# Patient Record
Sex: FEMALE | Race: WHITE | URBAN - METROPOLITAN AREA
[De-identification: names, ages, dates, MRNs, and addresses within clinical notes are randomized per-mention and may not be internally consistent; named-entity substitution may affect disease eponyms.]

---

## 2023-11-03 ENCOUNTER — HOSPITAL ENCOUNTER (EMERGENCY)
Age: 68
Discharge: HOME | End: 2023-11-04
Attending: EMERGENCY MEDICINE

## 2023-11-03 VITALS
OXYGEN SATURATION: 96 % | WEIGHT: 163 LBS | HEART RATE: 85 BPM | HEIGHT: 65 IN | RESPIRATION RATE: 18 BRPM | BODY MASS INDEX: 27.16 KG/M2 | SYSTOLIC BLOOD PRESSURE: 133 MMHG | DIASTOLIC BLOOD PRESSURE: 83 MMHG | TEMPERATURE: 98 F

## 2023-11-03 DIAGNOSIS — E87.1 HYPONATREMIA: Primary | ICD-10-CM

## 2023-11-03 LAB
ANION GAP SERPL CALCULATED.3IONS-SCNC: 12 MMOL/L (ref 3–16)
BASOPHILS # BLD: 0 K/UL (ref 0–0.2)
BASOPHILS NFR BLD: 0.5 %
BILIRUB UR QL STRIP.AUTO: NEGATIVE
BUN SERPL-MCNC: 10 MG/DL (ref 7–20)
CALCIUM SERPL-MCNC: 9.3 MG/DL (ref 8.3–10.6)
CHLORIDE SERPL-SCNC: 91 MMOL/L (ref 99–110)
CLARITY UR: CLEAR
CO2 SERPL-SCNC: 25 MMOL/L (ref 21–32)
COLOR UR: YELLOW
CREAT SERPL-MCNC: 0.5 MG/DL (ref 0.6–1.2)
DEPRECATED RDW RBC AUTO: 13 % (ref 12.4–15.4)
EOSINOPHIL # BLD: 0.1 K/UL (ref 0–0.6)
EOSINOPHIL NFR BLD: 1.6 %
GFR SERPLBLD CREATININE-BSD FMLA CKD-EPI: >60 ML/MIN/{1.73_M2}
GLUCOSE SERPL-MCNC: 105 MG/DL (ref 70–99)
GLUCOSE UR STRIP.AUTO-MCNC: NEGATIVE MG/DL
HCT VFR BLD AUTO: 37.9 % (ref 36–48)
HGB BLD-MCNC: 12.8 G/DL (ref 12–16)
HGB UR QL STRIP.AUTO: NEGATIVE
KETONES UR STRIP.AUTO-MCNC: NEGATIVE MG/DL
LEUKOCYTE ESTERASE UR QL STRIP.AUTO: NEGATIVE
LYMPHOCYTES # BLD: 1.4 K/UL (ref 1–5.1)
LYMPHOCYTES NFR BLD: 29.5 %
MAGNESIUM SERPL-MCNC: 2.1 MG/DL (ref 1.8–2.4)
MCH RBC QN AUTO: 31.3 PG (ref 26–34)
MCHC RBC AUTO-ENTMCNC: 33.8 G/DL (ref 31–36)
MCV RBC AUTO: 92.6 FL (ref 80–100)
MONOCYTES # BLD: 0.5 K/UL (ref 0–1.3)
MONOCYTES NFR BLD: 10.5 %
NEUTROPHILS # BLD: 2.8 K/UL (ref 1.7–7.7)
NEUTROPHILS NFR BLD: 57.9 %
NITRITE UR QL STRIP.AUTO: NEGATIVE
OSMOLALITY UR: 130 MOSM/KG (ref 390–1070)
PH UR STRIP.AUTO: 7 [PH] (ref 5–8)
PLATELET # BLD AUTO: 354 K/UL (ref 135–450)
PMV BLD AUTO: 8.2 FL (ref 5–10.5)
POTASSIUM SERPL-SCNC: 4.3 MMOL/L (ref 3.5–5.1)
PROT UR STRIP.AUTO-MCNC: NEGATIVE MG/DL
RBC # BLD AUTO: 4.09 M/UL (ref 4–5.2)
SODIUM SERPL-SCNC: 128 MMOL/L (ref 136–145)
SODIUM UR-SCNC: 28 MMOL/L
SP GR UR STRIP.AUTO: 1.01 (ref 1–1.03)
UA COMPLETE W REFLEX CULTURE PNL UR: NORMAL
UA DIPSTICK W REFLEX MICRO PNL UR: NORMAL
URN SPEC COLLECT METH UR: NORMAL
UROBILINOGEN UR STRIP-ACNC: 0.2 E.U./DL
WBC # BLD AUTO: 4.9 K/UL (ref 4–11)

## 2023-11-03 PROCEDURE — 81003 URINALYSIS AUTO W/O SCOPE: CPT

## 2023-11-03 PROCEDURE — 83935 ASSAY OF URINE OSMOLALITY: CPT

## 2023-11-03 PROCEDURE — 83735 ASSAY OF MAGNESIUM: CPT

## 2023-11-03 PROCEDURE — 80048 BASIC METABOLIC PNL TOTAL CA: CPT

## 2023-11-03 PROCEDURE — 84300 ASSAY OF URINE SODIUM: CPT

## 2023-11-03 PROCEDURE — 99283 EMERGENCY DEPT VISIT LOW MDM: CPT

## 2023-11-03 PROCEDURE — 85025 COMPLETE CBC W/AUTO DIFF WBC: CPT

## 2023-11-03 ASSESSMENT — ENCOUNTER SYMPTOMS
SHORTNESS OF BREATH: 0
NAUSEA: 0
ABDOMINAL PAIN: 0
VOMITING: 0
EYE REDNESS: 0

## 2023-11-03 NOTE — DISCHARGE INSTRUCTIONS
Follow-up with your primary care physician. Call to schedule a follow-up appointment. Return to emergency department with new or worsening symptoms.

## 2023-11-04 NOTE — ED PROVIDER NOTES
ED Attending Attestation Note     Date of evaluation: 11/3/2023    This patient was seen by the advance practice provider. I have seen and examined the patient, agree with the workup, evaluation, management and diagnosis. The care plan has been discussed. My assessment reveals asymptomatic adult female who presents after reported hyponatremia at 124 on her outpatient labs that were drawn for yearly visit to her physician. She is noted to have some previous hyponatremia in the past, and she says that she drinks lots of water occasionally Powerade but generally just free water daily because it helps with stiffness and numbness feeling in the morning. Sodium here is 128. I think this can be managed as an outpatient, rechecked in a week. We will have her increase her sodium intake in the meantime. For any worsening symptoms.      Debbie Huff MD  11/03/23 [id number]

## 2024-06-14 ENCOUNTER — HOSPITAL ENCOUNTER (INPATIENT)
Age: 69
LOS: 1 days | Discharge: HOME | End: 2024-06-15
Attending: EMERGENCY MEDICINE | Admitting: STUDENT IN AN ORGANIZED HEALTH CARE EDUCATION/TRAINING PROGRAM
Payer: MEDICARE

## 2024-06-14 ENCOUNTER — APPOINTMENT (OUTPATIENT)
Dept: CT IMAGING | Age: 69
End: 2024-06-14
Payer: MEDICARE

## 2024-06-14 DIAGNOSIS — E87.1 HYPONATREMIA: Primary | ICD-10-CM

## 2024-06-14 LAB
AMORPH SED URNS QL MICRO: ABNORMAL /HPF
ANION GAP SERPL CALCULATED.3IONS-SCNC: 12 MMOL/L (ref 3–16)
ANION GAP SERPL CALCULATED.3IONS-SCNC: 13 MMOL/L (ref 3–16)
BACTERIA URNS QL MICRO: ABNORMAL /HPF
BASOPHILS # BLD: 0 K/UL (ref 0–0.2)
BASOPHILS NFR BLD: 0.3 %
BILIRUB UR QL STRIP.AUTO: NEGATIVE
BUN SERPL-MCNC: 8 MG/DL (ref 7–20)
BUN SERPL-MCNC: 9 MG/DL (ref 7–20)
CALCIUM SERPL-MCNC: 8.7 MG/DL (ref 8.3–10.6)
CALCIUM SERPL-MCNC: 8.9 MG/DL (ref 8.3–10.6)
CHLORIDE SERPL-SCNC: 86 MMOL/L (ref 99–110)
CHLORIDE SERPL-SCNC: 99 MMOL/L (ref 99–110)
CLARITY UR: CLEAR
CO2 SERPL-SCNC: 22 MMOL/L (ref 21–32)
CO2 SERPL-SCNC: 24 MMOL/L (ref 21–32)
COLOR UR: YELLOW
CREAT SERPL-MCNC: 0.5 MG/DL (ref 0.6–1.2)
CREAT SERPL-MCNC: 0.7 MG/DL (ref 0.6–1.2)
DEPRECATED RDW RBC AUTO: 12.2 % (ref 12.4–15.4)
EKG ATRIAL RATE: 101 BPM
EKG DIAGNOSIS: NORMAL
EKG P AXIS: 43 DEGREES
EKG P-R INTERVAL: 170 MS
EKG Q-T INTERVAL: 358 MS
EKG QRS DURATION: 94 MS
EKG QTC CALCULATION (BAZETT): 464 MS
EKG R AXIS: 4 DEGREES
EKG T AXIS: 13 DEGREES
EKG VENTRICULAR RATE: 101 BPM
EOSINOPHIL # BLD: 0 K/UL (ref 0–0.6)
EOSINOPHIL NFR BLD: 0 %
GFR SERPLBLD CREATININE-BSD FMLA CKD-EPI: >90 ML/MIN/{1.73_M2}
GFR SERPLBLD CREATININE-BSD FMLA CKD-EPI: >90 ML/MIN/{1.73_M2}
GLUCOSE SERPL-MCNC: 117 MG/DL (ref 70–99)
GLUCOSE SERPL-MCNC: 149 MG/DL (ref 70–99)
GLUCOSE UR STRIP.AUTO-MCNC: NEGATIVE MG/DL
HCT VFR BLD AUTO: 37.7 % (ref 36–48)
HGB BLD-MCNC: 12.7 G/DL (ref 12–16)
HGB UR QL STRIP.AUTO: ABNORMAL
KETONES UR STRIP.AUTO-MCNC: NEGATIVE MG/DL
LEUKOCYTE ESTERASE UR QL STRIP.AUTO: NEGATIVE
LYMPHOCYTES # BLD: 0.6 K/UL (ref 1–5.1)
LYMPHOCYTES NFR BLD: 7 %
MAGNESIUM SERPL-MCNC: 1.8 MG/DL (ref 1.8–2.4)
MCH RBC QN AUTO: 31.5 PG (ref 26–34)
MCHC RBC AUTO-ENTMCNC: 33.6 G/DL (ref 31–36)
MCV RBC AUTO: 93.8 FL (ref 80–100)
MONOCYTES # BLD: 0.3 K/UL (ref 0–1.3)
MONOCYTES NFR BLD: 3.3 %
NEUTROPHILS # BLD: 7.9 K/UL (ref 1.7–7.7)
NEUTROPHILS NFR BLD: 89.4 %
NITRITE UR QL STRIP.AUTO: NEGATIVE
OSMOLALITY SERPL: 283 MOSM/KG (ref 278–305)
OSMOLALITY UR: 187 MOSM/KG (ref 390–1070)
PH UR STRIP.AUTO: 7 [PH] (ref 5–8)
PLATELET # BLD AUTO: 367 K/UL (ref 135–450)
PMV BLD AUTO: 7.9 FL (ref 5–10.5)
POTASSIUM SERPL-SCNC: 3.4 MMOL/L (ref 3.5–5.1)
POTASSIUM SERPL-SCNC: 3.6 MMOL/L (ref 3.5–5.1)
POTASSIUM UR-SCNC: 15.2 MMOL/L
PROT UR STRIP.AUTO-MCNC: NEGATIVE MG/DL
RBC # BLD AUTO: 4.03 M/UL (ref 4–5.2)
RBC #/AREA URNS HPF: ABNORMAL /HPF (ref 0–4)
SODIUM SERPL-SCNC: 121 MMOL/L (ref 136–145)
SODIUM SERPL-SCNC: 132 MMOL/L (ref 136–145)
SODIUM SERPL-SCNC: 135 MMOL/L (ref 136–145)
SODIUM UR-SCNC: 62 MMOL/L
SP GR UR STRIP.AUTO: 1.01 (ref 1–1.03)
TROPONIN, HIGH SENSITIVITY: 7 NG/L (ref 0–14)
UA COMPLETE W REFLEX CULTURE PNL UR: ABNORMAL
UA DIPSTICK W REFLEX MICRO PNL UR: YES
URN SPEC COLLECT METH UR: ABNORMAL
UROBILINOGEN UR STRIP-ACNC: 0.2 E.U./DL
WBC # BLD AUTO: 8.9 K/UL (ref 4–11)
WBC #/AREA URNS HPF: ABNORMAL /HPF (ref 0–5)

## 2024-06-14 PROCEDURE — 83735 ASSAY OF MAGNESIUM: CPT

## 2024-06-14 PROCEDURE — 6360000002 HC RX W HCPCS: Performed by: EMERGENCY MEDICINE

## 2024-06-14 PROCEDURE — 6370000000 HC RX 637 (ALT 250 FOR IP): Performed by: INTERNAL MEDICINE

## 2024-06-14 PROCEDURE — 96374 THER/PROPH/DIAG INJ IV PUSH: CPT

## 2024-06-14 PROCEDURE — 84295 ASSAY OF SERUM SODIUM: CPT

## 2024-06-14 PROCEDURE — 85025 COMPLETE CBC W/AUTO DIFF WBC: CPT

## 2024-06-14 PROCEDURE — 2580000003 HC RX 258: Performed by: EMERGENCY MEDICINE

## 2024-06-14 PROCEDURE — 84133 ASSAY OF URINE POTASSIUM: CPT

## 2024-06-14 PROCEDURE — 99285 EMERGENCY DEPT VISIT HI MDM: CPT

## 2024-06-14 PROCEDURE — 99223 1ST HOSP IP/OBS HIGH 75: CPT | Performed by: INTERNAL MEDICINE

## 2024-06-14 PROCEDURE — 2580000003 HC RX 258: Performed by: INTERNAL MEDICINE

## 2024-06-14 PROCEDURE — 83935 ASSAY OF URINE OSMOLALITY: CPT

## 2024-06-14 PROCEDURE — 84484 ASSAY OF TROPONIN QUANT: CPT

## 2024-06-14 PROCEDURE — 80048 BASIC METABOLIC PNL TOTAL CA: CPT

## 2024-06-14 PROCEDURE — 84300 ASSAY OF URINE SODIUM: CPT

## 2024-06-14 PROCEDURE — 6360000002 HC RX W HCPCS: Performed by: INTERNAL MEDICINE

## 2024-06-14 PROCEDURE — 96361 HYDRATE IV INFUSION ADD-ON: CPT

## 2024-06-14 PROCEDURE — 6360000002 HC RX W HCPCS: Performed by: STUDENT IN AN ORGANIZED HEALTH CARE EDUCATION/TRAINING PROGRAM

## 2024-06-14 PROCEDURE — 81001 URINALYSIS AUTO W/SCOPE: CPT

## 2024-06-14 PROCEDURE — 70450 CT HEAD/BRAIN W/O DYE: CPT

## 2024-06-14 PROCEDURE — 2580000003 HC RX 258: Performed by: STUDENT IN AN ORGANIZED HEALTH CARE EDUCATION/TRAINING PROGRAM

## 2024-06-14 PROCEDURE — 1200000000 HC SEMI PRIVATE

## 2024-06-14 PROCEDURE — 93005 ELECTROCARDIOGRAM TRACING: CPT | Performed by: EMERGENCY MEDICINE

## 2024-06-14 PROCEDURE — 36415 COLL VENOUS BLD VENIPUNCTURE: CPT

## 2024-06-14 PROCEDURE — 83930 ASSAY OF BLOOD OSMOLALITY: CPT

## 2024-06-14 RX ORDER — SODIUM CHLORIDE, SODIUM LACTATE, POTASSIUM CHLORIDE, AND CALCIUM CHLORIDE .6; .31; .03; .02 G/100ML; G/100ML; G/100ML; G/100ML
1000 INJECTION, SOLUTION INTRAVENOUS ONCE
Status: COMPLETED | OUTPATIENT
Start: 2024-06-14 | End: 2024-06-14

## 2024-06-14 RX ORDER — ONDANSETRON 2 MG/ML
4 INJECTION INTRAMUSCULAR; INTRAVENOUS EVERY 6 HOURS PRN
Status: DISCONTINUED | OUTPATIENT
Start: 2024-06-14 | End: 2024-06-15 | Stop reason: HOSPADM

## 2024-06-14 RX ORDER — LORAZEPAM 2 MG/ML
0.5 INJECTION INTRAMUSCULAR ONCE
Status: COMPLETED | OUTPATIENT
Start: 2024-06-14 | End: 2024-06-14

## 2024-06-14 RX ORDER — DESMOPRESSIN ACETATE 4 UG/ML
0.5 INJECTION, SOLUTION INTRAVENOUS; SUBCUTANEOUS ONCE
Status: COMPLETED | OUTPATIENT
Start: 2024-06-14 | End: 2024-06-14

## 2024-06-14 RX ORDER — SODIUM CHLORIDE 0.9 % (FLUSH) 0.9 %
5-40 SYRINGE (ML) INJECTION PRN
Status: DISCONTINUED | OUTPATIENT
Start: 2024-06-14 | End: 2024-06-15 | Stop reason: HOSPADM

## 2024-06-14 RX ORDER — POLYETHYLENE GLYCOL 3350 17 G/17G
17 POWDER, FOR SOLUTION ORAL DAILY PRN
Status: DISCONTINUED | OUTPATIENT
Start: 2024-06-14 | End: 2024-06-15 | Stop reason: HOSPADM

## 2024-06-14 RX ORDER — SODIUM CHLORIDE 0.9 % (FLUSH) 0.9 %
5-40 SYRINGE (ML) INJECTION EVERY 12 HOURS SCHEDULED
Status: DISCONTINUED | OUTPATIENT
Start: 2024-06-14 | End: 2024-06-15 | Stop reason: HOSPADM

## 2024-06-14 RX ORDER — SODIUM CHLORIDE 9 MG/ML
INJECTION, SOLUTION INTRAVENOUS PRN
Status: DISCONTINUED | OUTPATIENT
Start: 2024-06-14 | End: 2024-06-15 | Stop reason: HOSPADM

## 2024-06-14 RX ORDER — POTASSIUM CHLORIDE 20 MEQ/1
40 TABLET, EXTENDED RELEASE ORAL PRN
Status: DISCONTINUED | OUTPATIENT
Start: 2024-06-14 | End: 2024-06-15 | Stop reason: HOSPADM

## 2024-06-14 RX ORDER — RIZATRIPTAN BENZOATE 10 MG/1
TABLET, ORALLY DISINTEGRATING ORAL
COMMUNITY
Start: 2022-10-28

## 2024-06-14 RX ORDER — RIBOFLAVIN (VITAMIN B2) 100 MG
100 TABLET ORAL EVERY MORNING
COMMUNITY

## 2024-06-14 RX ORDER — AMLODIPINE BESYLATE 2.5 MG/1
2.5 TABLET ORAL DAILY
Status: DISCONTINUED | OUTPATIENT
Start: 2024-06-14 | End: 2024-06-15 | Stop reason: HOSPADM

## 2024-06-14 RX ORDER — DESMOPRESSIN ACETATE 4 UG/ML
1 INJECTION, SOLUTION INTRAVENOUS; SUBCUTANEOUS ONCE
Status: COMPLETED | OUTPATIENT
Start: 2024-06-14 | End: 2024-06-14

## 2024-06-14 RX ORDER — M-VIT,TX,IRON,MINS/CALC/FOLIC 27MG-0.4MG
1 TABLET ORAL EVERY MORNING
COMMUNITY

## 2024-06-14 RX ORDER — ACETAMINOPHEN 325 MG/1
650 TABLET ORAL EVERY 6 HOURS PRN
Status: DISCONTINUED | OUTPATIENT
Start: 2024-06-14 | End: 2024-06-15 | Stop reason: HOSPADM

## 2024-06-14 RX ORDER — GLUCOSAMINE SULFATE 500 MG
500 CAPSULE ORAL EVERY MORNING
COMMUNITY

## 2024-06-14 RX ORDER — ACETAMINOPHEN 650 MG/1
650 SUPPOSITORY RECTAL EVERY 6 HOURS PRN
Status: DISCONTINUED | OUTPATIENT
Start: 2024-06-14 | End: 2024-06-15 | Stop reason: HOSPADM

## 2024-06-14 RX ORDER — MAGNESIUM OXIDE 400 MG/1
400 TABLET ORAL EVERY MORNING
COMMUNITY

## 2024-06-14 RX ORDER — MAGNESIUM SULFATE IN WATER 40 MG/ML
2000 INJECTION, SOLUTION INTRAVENOUS PRN
Status: DISCONTINUED | OUTPATIENT
Start: 2024-06-14 | End: 2024-06-15 | Stop reason: HOSPADM

## 2024-06-14 RX ORDER — POTASSIUM CHLORIDE 7.45 MG/ML
10 INJECTION INTRAVENOUS PRN
Status: DISCONTINUED | OUTPATIENT
Start: 2024-06-14 | End: 2024-06-15 | Stop reason: HOSPADM

## 2024-06-14 RX ORDER — ONDANSETRON 4 MG/1
4 TABLET, ORALLY DISINTEGRATING ORAL EVERY 8 HOURS PRN
Status: DISCONTINUED | OUTPATIENT
Start: 2024-06-14 | End: 2024-06-15 | Stop reason: HOSPADM

## 2024-06-14 RX ORDER — DEXTROSE MONOHYDRATE 50 MG/ML
INJECTION, SOLUTION INTRAVENOUS CONTINUOUS
Status: ACTIVE | OUTPATIENT
Start: 2024-06-14 | End: 2024-06-15

## 2024-06-14 RX ORDER — ASCORBIC ACID 500 MG
500 TABLET ORAL EVERY MORNING
COMMUNITY

## 2024-06-14 RX ORDER — ENOXAPARIN SODIUM 100 MG/ML
40 INJECTION SUBCUTANEOUS DAILY
Status: DISCONTINUED | OUTPATIENT
Start: 2024-06-14 | End: 2024-06-15 | Stop reason: HOSPADM

## 2024-06-14 RX ORDER — DEXTROSE MONOHYDRATE 50 MG/ML
INJECTION, SOLUTION INTRAVENOUS CONTINUOUS
Status: ACTIVE | OUTPATIENT
Start: 2024-06-14 | End: 2024-06-14

## 2024-06-14 RX ORDER — VITAMIN B COMPLEX
1000 TABLET ORAL DAILY
COMMUNITY

## 2024-06-14 RX ADMIN — DESMOPRESSIN ACETATE 0.52 MCG: 4 INJECTION, SOLUTION INTRAVENOUS; SUBCUTANEOUS at 18:09

## 2024-06-14 RX ADMIN — DEXTROSE MONOHYDRATE: 50 INJECTION, SOLUTION INTRAVENOUS at 17:06

## 2024-06-14 RX ADMIN — SODIUM CHLORIDE, PRESERVATIVE FREE 10 ML: 5 INJECTION INTRAVENOUS at 21:04

## 2024-06-14 RX ADMIN — ENOXAPARIN SODIUM 40 MG: 100 INJECTION SUBCUTANEOUS at 15:10

## 2024-06-14 RX ADMIN — AMLODIPINE BESYLATE 2.5 MG: 2.5 TABLET ORAL at 18:15

## 2024-06-14 RX ADMIN — LORAZEPAM 0.5 MG: 2 INJECTION INTRAMUSCULAR; INTRAVENOUS at 08:24

## 2024-06-14 RX ADMIN — DEXTROSE MONOHYDRATE: 50 INJECTION, SOLUTION INTRAVENOUS at 23:55

## 2024-06-14 RX ADMIN — SODIUM CHLORIDE, POTASSIUM CHLORIDE, SODIUM LACTATE AND CALCIUM CHLORIDE 1000 ML: 600; 310; 30; 20 INJECTION, SOLUTION INTRAVENOUS at 08:26

## 2024-06-14 RX ADMIN — DESMOPRESSIN ACETATE 1 MCG: 4 INJECTION, SOLUTION INTRAVENOUS; SUBCUTANEOUS at 23:56

## 2024-06-14 ASSESSMENT — LIFESTYLE VARIABLES
HOW OFTEN DO YOU HAVE A DRINK CONTAINING ALCOHOL: NEVER
HOW MANY STANDARD DRINKS CONTAINING ALCOHOL DO YOU HAVE ON A TYPICAL DAY: PATIENT DOES NOT DRINK

## 2024-06-14 ASSESSMENT — PAIN - FUNCTIONAL ASSESSMENT: PAIN_FUNCTIONAL_ASSESSMENT: NONE - DENIES PAIN

## 2024-06-14 NOTE — ACP (ADVANCE CARE PLANNING)
Advance Care Planning     Advance Care Planning Inpatient Note  [facility] Department    Today's Date: 6/14/2024  Unit: [facility] Formerly named [facility]    Received request from IDT Member.  Upon review of chart and communication with care team, patient's decision making abilities are not in question.. Patient and Spouse were present in the room during visit.    Goals of ACP Conversation:  Discuss advance care planning documents    Health Care Decision Makers:       Primary Decision Maker: David ayon - Spouse - [phone]    Secondary Decision Maker: Irena Strauss - Child - [phone]  Summary:  Updated Healthcare Decision Maker  Documented Next of Kin, per patient report    Advance Care Planning Documents (Patient Wishes):  Healthcare Power of /Advance Directive Appointment of Health Care Agent  Living Will/Advance Directive     Assessment:  Pt has an Advanced Directives completed in the past. Pt asked to get more information on Advanced Directives.  offered education for understanding. Pt and spouse said they have to review the previous document they have and if possible make changes.  gave pt a blank copy of Advanced directives. Pt listed her  David as primary decision maker, while Irena her daughter was listed as secondary decision maker pending when they are ready to complete a new Advanced Directives. Pt will notify [facility] for assistance when ready.    Interventions:  Provided education on documents for clarity and greater understanding  Discussed and provided education on state decision maker hierarchy  Reviewed but did not complete ACP document    Care Preferences Communicated:   Ventilation:   If the patient, in their present state of health, suddenly became very ill and unable to breathe on their own,     the patient would desire the use of a ventilator (breathing machine).    If their health worsens and it becomes clear that the change of recovery is

## 2024-06-14 NOTE — PROGRESS NOTES
_4 Eyes Skin Assessment     NAME:  Kai Mast  YOB: 1955  MEDICAL RECORD NUMBER:  [id number]    The patient is being assessed for  Admission    I agree that at least one RN has performed a thorough Head to Toe Skin Assessment on the patient. ALL assessment sites listed below have been assessed.      Areas assessed by both nurses:    Head, Face, Ears, Shoulders, Back, Chest, Arms, Elbows, Hands, Sacrum. Buttock, Coccyx, Ischium, Legs. Feet and Heels, and Under Medical Devices         Does the Patient have a Wound? No noted wound(s)       Caleb Prevention initiated by RN: Yes  Wound Care Orders initiated by RN: No    Pressure Injury (Stage 3,4, Unstageable, DTI, NWPT, and Complex wounds) if present, place Wound referral order by RN under : No    New Ostomies, if present place, Ostomy referral order under : No     Nurse 1 eSignature: Electronically signed by LUIGI COLÓN RN on 6/14/24 at 4:06 PM EDT    **SHARE this note so that the co-signing nurse can place an eSignature**    Nurse 2 eSignature: Electronically signed by Percy Sesay RN on 6/14/24 at 4:08 PM EDT

## 2024-06-14 NOTE — PLAN OF CARE
Problem: Discharge Planning  Goal: Discharge to home or other facility with appropriate resources  6/14/2024 1929 by Mireya Garcia RN  Outcome: Progressing     Problem: Safety - Adult  Goal: Free from fall injury  Outcome: Progressing  Note: Pt remained free of falls. Bed lowest position, grippers socks on, bed lock and call light within reach.      Problem: Chronic Conditions and Co-morbidities  Goal: Patient's chronic conditions and co-morbidity symptoms are monitored and maintained or improved  Outcome: Progressing  Flowsheets (Taken 6/14/2024 1930)  Care Plan - Patient's Chronic Conditions and Co-Morbidity Symptoms are Monitored and Maintained or Improved: Monitor and assess patient's chronic conditions and comorbid symptoms for stability, deterioration, or improvement

## 2024-06-14 NOTE — PROGRESS NOTES
Pt arrived on floor. Full assessment completed, tele placed, no skin issues. A/O X4, no complaint of pain at the moment. Oriented to room and call light. Call light education given. Bed lowest position, grippers socks on and call light within reach.

## 2024-06-14 NOTE — H&P
V2.0  History and Physical      Name:  Kai Mast /Age/Sex: 1955  (68 y.o. female)   MRN & CSN:  [id number] & [id number] Encounter Date/Time: 2024 12:39 PM EDT   Location:  [facility] PCP: No primary care provider on file.       Hospital Day: 1    Assessment and Plan:   Kai Mast is a 68 y.o. female with a pmh of migraines who presents with Hyponatremia    Hospital Problems             Last Modified POA    * (Principal) Hyponatremia 2024 Yes       Plan:  Admit to hospital  Tele  Trend Na, goal to avoid over-correction, target Na ~127 tomorrow  Consulted Nephrology, discussed case, they agreed to see patient  Check urine osm, urine Na    Disposition:   Current Living situation: Home  Expected Disposition: Home  Estimated D/C: 2-3 days    Diet ADULT DIET; Regular   DVT Prophylaxis [x] Lovenox, []  Heparin, [] SCDs, [] Ambulation,  [] Eliquis, [] Xarelto, [] Coumadin   Code Status Full Code   Surrogate Decision Maker/ POA David Mast, spouse     Personally reviewed Lab Studies and Imaging     Discussed management of the case with ED physician and agree with plan for hospitalization        History from:     patient    History of Present Illness:     Chief Complaint: Fatigue    Kai Mast is a 68 y.o. female with pmh of migraine headaches who presented with headache/malaise. Patient in usual state of health until last night when she had to work through the night. She developed a migraine headache and took her prescribed triptan medication but headache did not resolve as normal. She reports drinking a lot of coffee and water overnight to try to help headache. In addition patient then began feeling 'off' in general, a sensation she felt before ~1 year ago when she had an episode of hyponatremia. Decided to come to ED for evaluation in am. Denies any recent illnesses and denies any other recent symptoms. Aside from her triptan she is not taking any other prescription medications.    In  Known Allergies  Fam HX:  family history is not on file.  Soc HX:   Social History     Socioeconomic History    Marital status:      Spouse name: None    Number of children: None    Years of education: None    Highest education level: None   Tobacco Use    Smoking status: Never    Smokeless tobacco: Never   Vaping Use    Vaping Use: Unknown   Substance and Sexual Activity    Alcohol use: Not Currently    Drug use: Not Currently    Sexual activity: Defer       Medications:   Medications:    sodium chloride flush  5-40 mL IntraVENous 2 times per day    enoxaparin  40 mg SubCUTAneous Daily      Infusions:    sodium chloride       PRN Meds: sodium chloride flush, 5-40 mL, PRN  sodium chloride, , PRN  potassium chloride, 40 mEq, PRN   Or  potassium alternative oral replacement, 40 mEq, PRN   Or  potassium chloride, 10 mEq, PRN  magnesium sulfate, 2,000 mg, PRN  ondansetron, 4 mg, Q8H PRN   Or  ondansetron, 4 mg, Q6H PRN  polyethylene glycol, 17 g, Daily PRN  acetaminophen, 650 mg, Q6H PRN   Or  acetaminophen, 650 mg, Q6H PRN        Labs      CBC:   Recent Labs     06/14/24  0829   WBC 8.9   HGB 12.7        BMP:    Recent Labs     06/14/24  0829   *   K 3.4*   CL 86*   CO2 22   BUN 8   CREATININE 0.5*   GLUCOSE 149*     Hepatic: No results for input(s): \"AST\", \"ALT\", \"BILITOT\", \"ALKPHOS\" in the last 72 hours.    Invalid input(s): \"ALB\"  Lipids: No results found for: \"CHOL\", \"HDL\", \"TRIG\"  Hemoglobin A1C: No results found for: \"LABA1C\"  TSH: No results found for: \"TSH\"  Troponin: No results found for: \"TROPONINT\"  Lactic Acid: No results for input(s): \"LACTA\" in the last 72 hours.  BNP: No results for input(s): \"PROBNP\" in the last 72 hours.  UA:  Lab Results   Component Value Date/Time    NITRU Negative 06/14/2024 10:18 AM    COLORU Yellow 06/14/2024 10:18 AM    PHUR 7.0 06/14/2024 10:18 AM    PHUR 7.0 11/03/2023 06:37 PM    WBCUA None seen 06/14/2024 10:18 AM    RBCUA 0-2 06/14/2024 10:18 AM

## 2024-06-14 NOTE — ED NOTES
ED TO INPATIENT SBAR HANDOFF    Patient Name: Kai Mast   :  1955  68 y.o.   MRN:  [id number]  Preferred Name  Kai  ED Room #:  A11/A11-11  Family/Caregiver Present yes   Restraints no   Sitter no   Sepsis Risk Score Sepsis Risk Score: 2.11    Situation  Code Status: Full Code No additional code details.    Allergies: Patient has no known allergies.  Weight: Patient Vitals for the past 96 hrs (Last 3 readings):   Weight   24 0738 77.9 kg (171 lb 12.8 oz)     Arrived from: home  Chief Complaint:   Chief Complaint   Patient presents with    Headache     Pt reports she had a migraine all of a sudden that has resolved. Pt reports that she has drank plenty of fluids causing her urine to turn pale.    Dehydration     Pt is concerned for Dehydration     Hospital Problem/Diagnosis:  Principal Problem:    Hyponatremia  Resolved Problems:    * No resolved hospital problems. *    Imaging:   CT Head W/O Contrast   Final Result   1. No acute intracranial abnormality.      Electronically signed by Jatinder Ross        Abnormal labs:   Abnormal Labs Reviewed   CBC WITH AUTO DIFFERENTIAL - Abnormal; Notable for the following components:       Result Value    RDW 12.2 (*)     Neutrophils Absolute 7.9 (*)     Lymphocytes Absolute 0.6 (*)     All other components within normal limits   BASIC METABOLIC PANEL W/ REFLEX TO MG FOR LOW K - Abnormal; Notable for the following components:    Sodium 121 (*)     Potassium reflex Magnesium 3.4 (*)     Chloride 86 (*)     Glucose 149 (*)     Creatinine 0.5 (*)     All other components within normal limits   URINALYSIS WITH REFLEX TO CULTURE - Abnormal; Notable for the following components:    Blood, Urine TRACE-INTACT (*)     All other components within normal limits   MICROSCOPIC URINALYSIS - Abnormal; Notable for the following components:    Bacteria, UA Rare (*)     All other components within normal limits     Critical values:  sodium 121    Abnormal Assessment

## 2024-06-14 NOTE — CONSULTS
Nephrology Consult Note                                                                                                                                                                                                                                                                                                                                                               Office : [phone]     Fax :[phone]    Patient's Name: Kai Mast  5:19 PM  6/14/2024    Reason for Consult:  hypo-Na   Requesting Physician:  No primary care provider on file.  Chief Complaint:    Chief Complaint   Patient presents with    Headache     Pt reports she had a migraine all of a sudden that has resolved. Pt reports that she has drank plenty of fluids causing her urine to turn pale.    Dehydration     Pt is concerned for Dehydration       Assessment/Plan     # Hyponatremia moderate   Euvolemic   U osm 130   U Na 28  S/p LR  A/p-   Hyponatremia 2/2 polydipsia   Rapidly correcting   Plan:  DDAVP single dose 0.5 mcg I.v  D5w 100 cc/ hr 4 hours  Na q 4   U lytes   Goal Na tomorrow morning: <= 131     # HTN  Elevated   Amlodipine 5 mg     # Migraine  May benefit form headache specialist  Discussed the need to avoid water intake > 60 ounces daily       History of Present Ilness:    Kai Mast is a 68 y.o. female with pmh of migraine headaches who presented with headache/malaise. Yesterday pt  developed a migraine headache and took her prescribed triptan medication She reports drinking a lot of coffee and water overnight to try to help headache. In addition patient then began feeling 'off' in general, a sensation she felt before ~1 year ago when she had an episode of hyponatremia.   Decided to come to ED for evaluation in am. Denies any recent illnesses and denies any other recent symptoms. Pt denies CP/SOB/palpitations/abdominal pain/N/V.   Pt denies fever/ chills  Pt denies dysuria or hematuria     In the ED vitals were  stable and she was neurologically intact. Serum sodium returned at 121 6/14 08:30---> 132 15:00     Pt reports drinking daily around 120 ounces fluids     Interval hx     History reviewed. No pertinent past medical history.    Past Surgical History:   Procedure Laterality Date    APPENDECTOMY         History reviewed. No pertinent family history.     reports that she has never smoked. She has never used smokeless tobacco. She reports that she does not currently use alcohol. She reports that she does not currently use drugs.    Allergies:  Patient has no known allergies.    Current Medications:    sodium chloride flush 0.9 % injection 5-40 mL, 2 times per day  sodium chloride flush 0.9 % injection 5-40 mL, PRN  0.9 % sodium chloride infusion, PRN  potassium chloride (KLOR-CON M) extended release tablet 40 mEq, PRN   Or  potassium bicarb-citric acid (EFFER-K) effervescent tablet 40 mEq, PRN   Or  potassium chloride 10 mEq/100 mL IVPB (Peripheral Line), PRN  magnesium sulfate 2000 mg in 50 mL IVPB premix, PRN  enoxaparin (LOVENOX) injection 40 mg, Daily  ondansetron (ZOFRAN-ODT) disintegrating tablet 4 mg, Q8H PRN   Or  ondansetron (ZOFRAN) injection 4 mg, Q6H PRN  polyethylene glycol (GLYCOLAX) packet 17 g, Daily PRN  acetaminophen (TYLENOL) tablet 650 mg, Q6H PRN   Or  acetaminophen (TYLENOL) suppository 650 mg, Q6H PRN  dextrose 5 % solution, Continuous  DESMOpressin (DDAVP) injection 0.52 mcg, Once        Review of Systems:   14 point ROS obtained but were negative except mentioned in HPI      Physical exam:     Vitals:  BP (!) 162/91   Pulse 77   Temp 97.9 °F (36.6 °C) (Oral)   Resp 24   Ht 1.651 m (5' 5\")   Wt 73 kg (161 lb)   SpO2 96%   BMI 26.79 kg/m²   Constitutional:  OAA X3 NAD Yes  Skin: no rash, turgor wnl  Heent:  eomi, mmm  Neck: no bruits or jvd noted  Cardiovascular:  S1, S2 without m/r/g  Respiratory: CTA B without w/r/r  Abdomen:  , soft, nt, nd  Ext:  lower extremity edema No  Psychiatric:

## 2024-06-15 VITALS
OXYGEN SATURATION: 97 % | DIASTOLIC BLOOD PRESSURE: 78 MMHG | HEART RATE: 67 BPM | RESPIRATION RATE: 19 BRPM | TEMPERATURE: 97.5 F | WEIGHT: 161 LBS | SYSTOLIC BLOOD PRESSURE: 146 MMHG | BODY MASS INDEX: 26.82 KG/M2 | HEIGHT: 65 IN

## 2024-06-15 LAB
ANION GAP SERPL CALCULATED.3IONS-SCNC: 10 MMOL/L (ref 3–16)
ANION GAP SERPL CALCULATED.3IONS-SCNC: 10 MMOL/L (ref 3–16)
BASOPHILS # BLD: 0 K/UL (ref 0–0.2)
BASOPHILS NFR BLD: 1.1 %
BUN SERPL-MCNC: 7 MG/DL (ref 7–20)
BUN SERPL-MCNC: 7 MG/DL (ref 7–20)
CALCIUM SERPL-MCNC: 8.5 MG/DL (ref 8.3–10.6)
CALCIUM SERPL-MCNC: 8.7 MG/DL (ref 8.3–10.6)
CHLORIDE SERPL-SCNC: 98 MMOL/L (ref 99–110)
CHLORIDE SERPL-SCNC: 99 MMOL/L (ref 99–110)
CO2 SERPL-SCNC: 25 MMOL/L (ref 21–32)
CO2 SERPL-SCNC: 25 MMOL/L (ref 21–32)
CREAT SERPL-MCNC: 0.5 MG/DL (ref 0.6–1.2)
CREAT SERPL-MCNC: <0.5 MG/DL (ref 0.6–1.2)
DEPRECATED RDW RBC AUTO: 12.2 % (ref 12.4–15.4)
EOSINOPHIL # BLD: 0.1 K/UL (ref 0–0.6)
EOSINOPHIL NFR BLD: 1.5 %
GFR SERPLBLD CREATININE-BSD FMLA CKD-EPI: >90 ML/MIN/{1.73_M2}
GFR SERPLBLD CREATININE-BSD FMLA CKD-EPI: >90 ML/MIN/{1.73_M2}
GLUCOSE SERPL-MCNC: 101 MG/DL (ref 70–99)
GLUCOSE SERPL-MCNC: 101 MG/DL (ref 70–99)
HCT VFR BLD AUTO: 36 % (ref 36–48)
HGB BLD-MCNC: 12.2 G/DL (ref 12–16)
LYMPHOCYTES # BLD: 1.3 K/UL (ref 1–5.1)
LYMPHOCYTES NFR BLD: 28 %
MCH RBC QN AUTO: 31.7 PG (ref 26–34)
MCHC RBC AUTO-ENTMCNC: 34 G/DL (ref 31–36)
MCV RBC AUTO: 93 FL (ref 80–100)
MONOCYTES # BLD: 0.4 K/UL (ref 0–1.3)
MONOCYTES NFR BLD: 9.6 %
NEUTROPHILS # BLD: 2.7 K/UL (ref 1.7–7.7)
NEUTROPHILS NFR BLD: 59.8 %
PLATELET # BLD AUTO: 329 K/UL (ref 135–450)
PMV BLD AUTO: 7.1 FL (ref 5–10.5)
POTASSIUM SERPL-SCNC: 3.7 MMOL/L (ref 3.5–5.1)
POTASSIUM SERPL-SCNC: 3.8 MMOL/L (ref 3.5–5.1)
RBC # BLD AUTO: 3.87 M/UL (ref 4–5.2)
SODIUM SERPL-SCNC: 133 MMOL/L (ref 136–145)
SODIUM SERPL-SCNC: 133 MMOL/L (ref 136–145)
SODIUM SERPL-SCNC: 134 MMOL/L (ref 136–145)
TSH SERPL DL<=0.005 MIU/L-ACNC: 1.73 UIU/ML (ref 0.27–4.2)
WBC # BLD AUTO: 4.6 K/UL (ref 4–11)

## 2024-06-15 PROCEDURE — 2580000003 HC RX 258: Performed by: STUDENT IN AN ORGANIZED HEALTH CARE EDUCATION/TRAINING PROGRAM

## 2024-06-15 PROCEDURE — 6360000002 HC RX W HCPCS: Performed by: STUDENT IN AN ORGANIZED HEALTH CARE EDUCATION/TRAINING PROGRAM

## 2024-06-15 PROCEDURE — 85025 COMPLETE CBC W/AUTO DIFF WBC: CPT

## 2024-06-15 PROCEDURE — 99233 SBSQ HOSP IP/OBS HIGH 50: CPT | Performed by: INTERNAL MEDICINE

## 2024-06-15 PROCEDURE — 36415 COLL VENOUS BLD VENIPUNCTURE: CPT

## 2024-06-15 PROCEDURE — 84443 ASSAY THYROID STIM HORMONE: CPT

## 2024-06-15 PROCEDURE — 6370000000 HC RX 637 (ALT 250 FOR IP): Performed by: INTERNAL MEDICINE

## 2024-06-15 PROCEDURE — 2580000003 HC RX 258: Performed by: INTERNAL MEDICINE

## 2024-06-15 PROCEDURE — 84295 ASSAY OF SERUM SODIUM: CPT

## 2024-06-15 PROCEDURE — 80048 BASIC METABOLIC PNL TOTAL CA: CPT

## 2024-06-15 RX ORDER — DEXTROSE MONOHYDRATE 50 MG/ML
INJECTION, SOLUTION INTRAVENOUS CONTINUOUS
Status: ACTIVE | OUTPATIENT
Start: 2024-06-15 | End: 2024-06-15

## 2024-06-15 RX ADMIN — ENOXAPARIN SODIUM 40 MG: 100 INJECTION SUBCUTANEOUS at 09:17

## 2024-06-15 RX ADMIN — SODIUM CHLORIDE, PRESERVATIVE FREE 10 ML: 5 INJECTION INTRAVENOUS at 09:19

## 2024-06-15 RX ADMIN — DEXTROSE MONOHYDRATE: 50 INJECTION, SOLUTION INTRAVENOUS at 09:21

## 2024-06-15 RX ADMIN — AMLODIPINE BESYLATE 2.5 MG: 2.5 TABLET ORAL at 09:17

## 2024-06-15 NOTE — DISCHARGE SUMMARY
V2.0  Discharge Summary    Name:  Kai Mast /Age/Sex: 1955 (68 y.o. female)   Admit Date: 2024  Discharge Date: 6/15/24    MRN & CSN:  [id number] & [id number] Encounter Date and Time 6/15/24 12:14 PM EDT    Attending:  Javed Roman MD Discharging Provider: Javed Roman MD       Hospital Course:     Kai Mast is a 68 y.o. female with pmh of migraine headaches who presented with headache/malaise. Had worked all night prior with migraine and drank a lot of water/coffee per patient report. Found to be hyponatremic to 120s and admitted for further care, Nephrology consulted and evaluated. Placed on fluid restriction and Na monitored. Na corrected to low 130s following day and symptoms resolved. After discussing with Nephrology patient deemed safe for discharge home with outpatient Nephrology follow-up. Advised on 50 fl oz fluid restriction prior to discharge.      The patient expressed appropriate understanding of, and agreement with the discharge recommendations, medications, and plan.     Consults this admission:  IP CONSULT TO SPIRITUAL SERVICES    Discharge Diagnosis:   Hyponatremia        Discharge Instruction:   Follow up appointments: Nephrology in 1-2 weeks  Primary care physician: No primary care provider on file. within 2 weeks  Diet: regular diet   Activity: activity as tolerated  Disposition: Discharged to:   [x]Home, [][facility], []SNF, []Acute Rehab, []Hospice   Condition on discharge: Stable  Labs and Tests to be Followed up as an outpatient by PCP or Specialist: KURTIS in 1 week    Discharge Medications:        Medication List        CONTINUE taking these medications      Glucosamine 500 MG Caps     magnesium oxide 400 MG tablet  Commonly known as: MAG-OX     rizatriptan 10 MG disintegrating tablet  Commonly known as: MAXALT-MLT     therapeutic multivitamin-minerals tablet     vitamin B-2 100 MG Tabs tablet  Commonly known as: RIBOFLAVIN     vitamin C 500 MG tablet  Commonly known  suprapubic tenderness  Musculoskeletal: No edema  Skin: warm, dry  Neuro: Alert.  Psych: Mood appropriate.         Labs and Imaging   CT Head W/O Contrast    Result Date: 6/14/2024  HISTORY: Headache. Exam: CT of the head without contrast COMPARISON:  none. Technique: Multiple axial images were obtained of the head without IV contrast. Up-to-date CT equipment and radiation dose reduction techniques were employed. FINDINGS: The visualized paranasal sinuses, mastoid air cells, and middle ears are clear. The orbits and osseous structures are unremarkable. Intracranially, no mass, midline shift, or acute intracranial hemorrhage. The ventricles and cisternal spaces are appropriate. No focal hypodensity to suggest acute ischemia.     1. No acute intracranial abnormality. Electronically signed by Jatinder Ross      CBC:   Recent Labs     06/14/24  0829 06/15/24  0655   WBC 8.9 4.6   HGB 12.7 12.2    329     BMP:    Recent Labs     06/14/24  0829 06/14/24  1457 06/14/24  2122 06/15/24  0146 06/15/24  0655 06/15/24  0733   *   < > 135* 134* 134*  133* 133*  134*   K 3.4*  --  3.6  --  3.7  3.8  --    CL 86*  --  99  --  99  98*  --    CO2 22  --  24  --  25  25  --    BUN 8  --  9  --  7  7  --    CREATININE 0.5*  --  0.7  --  0.5*  <0.5*  --    GLUCOSE 149*  --  117*  --  101*  101*  --     < > = values in this interval not displayed.     Hepatic: No results for input(s): \"AST\", \"ALT\", \"BILITOT\", \"ALKPHOS\" in the last 72 hours.    Invalid input(s): \"ALB\"  Lipids: No results found for: \"CHOL\", \"HDL\", \"TRIG\"  Hemoglobin A1C: No results found for: \"LABA1C\"  TSH:   Lab Results   Component Value Date/Time    TSH 1.73 06/15/2024 06:55 AM     Troponin: No results found for: \"TROPONINT\"  Lactic Acid: No results for input(s): \"LACTA\" in the last 72 hours.  BNP: No results for input(s): \"PROBNP\" in the last 72 hours.  UA:  Lab Results   Component Value Date/Time    NITRU Negative 06/14/2024 10:18 AM    COLORU

## 2024-06-15 NOTE — CARE COORDINATION
Case Management Assessment            Discharge Note                    Date / Time of Note: 6/15/2024 2:33 PM                  Discharge Note Completed by: SERJIO Daily    Patient Name: Kai Mast   YOB: 1955  Diagnosis: Hyponatremia [E87.1]   Date / Time: 6/14/2024  7:36 AM    Current PCP: No primary care provider on file.  Clinic patient: No    Hospitalization in the last 30 days: No       Advance Directives:  Code Status: Full Code  [place] DNR form completed and on chart: Not Indicated    Financial:  Payor: [employer] MEDICARE / Plan: [employer] DUAL ADVANTAGE / Product Type: *No Product type* /      Pharmacy:    [facility] [id number] [place], [place] - [address] - P [phone] - F [phone]  [address]  [facility] [address]  Phone: [phone] Fax: [phone]      Assistance purchasing medications?:    Assistance provided by Case Management: None at this time    Does patient want to participate in local refill/ meds to beds program?:      Meds To Beds General Rules:  1. Can ONLY be done Monday- Friday between 8:30am-5pm  2. Prescription(s) must be in pharmacy by 3pm to be filled same day  3.Copy of patient's insurance/ prescription drug card and patient face sheet must be sent along with the prescription(s)  4. Cost of Rx cannot be added to hospital bill. If financial assistance is needed, please contact unit  or ;  or  CANNOT provide pharmacy voucher for patients co-pays  5. Patients can then  the prescription on their way out of the hospital at discharge, or pharmacy can deliver to the bedside if staff is available. (payment due at time of pick-up or delivery - cash, check, or card accepted)     Able to afford home medications/ co-pay costs: Yes    ADLS:  Current PT AM-PAC Score:   /24  Current OT AM-PAC Score:   /24    DISCHARGE Disposition: Home- No Services Needed    LOC at discharge: Not

## 2024-06-15 NOTE — PLAN OF CARE
Problem: Discharge Planning  Goal: Discharge to home or other facility with appropriate resources  6/15/2024 1508 by Mireya Garcia, RN  Outcome: Progressing     Problem: Safety - Adult  Goal: Free from fall injury  6/15/2024 1508 by Mireya Garcia, RN  Outcome: Progressing  Note: Pt remains free of fall. Bed lowest position, bed lock, grippers socks on and call light within reach.      Problem: Chronic Conditions and Co-morbidities  Goal: Patient's chronic conditions and co-morbidity symptoms are monitored and maintained or improved  Outcome: Progressing  Flowsheets (Taken 6/14/2024 2057 by Rosa Maria Sims, RN)  Care Plan - Patient's Chronic Conditions and Co-Morbidity Symptoms are Monitored and Maintained or Improved: Collaborate with multidisciplinary team to address chronic and comorbid conditions and prevent exacerbation or deterioration

## 2024-06-15 NOTE — PLAN OF CARE
Problem: Safety - Adult  Goal: Free from fall injury  6/14/2024 2331 by Rosa Maria Sims, RN  Outcome: Progressing     Problem: Chronic Conditions and Co-morbidities  Goal: Patient's chronic conditions and co-morbidity symptoms are monitored and maintained or improved  6/14/2024 2331 by Rosa Maria Sims, RN  Outcome: Progressing  Flowsheets (Taken 6/14/2024 2057)  Care Plan - Patient's Chronic Conditions and Co-Morbidity Symptoms are Monitored and Maintained or Improved: Collaborate with multidisciplinary team to address chronic and comorbid conditions and prevent exacerbation or deterioration    Outcome: Progressing  Flowsheets (Taken 6/14/2024 1930)  Care Plan - Patient's Chronic Conditions and Co-Morbidity Symptoms are Monitored and Maintained or Improved: Monitor and assess patient's chronic conditions and comorbid symptoms for stability, deterioration, or improvement

## 2024-06-15 NOTE — PROGRESS NOTES
Pt discharged home. IV and tele removed. Discharge paperwork and education given to pt. Belongings packed and sent with pt.

## 2024-06-15 NOTE — PROGRESS NOTES
Nephrology Progress Note                                                                                                                                                                                                                                                                                                                                                               Office : [phone]     Fax :[phone]    Patient's Name: Kai Mast  9:43 AM  6/15/2024    Reason for Consult:  hypo-Na   Requesting Physician:  No primary care provider on file.  Chief Complaint:    Chief Complaint   Patient presents with    Headache     Pt reports she had a migraine all of a sudden that has resolved. Pt reports that she has drank plenty of fluids causing her urine to turn pale.    Dehydration     Pt is concerned for Dehydration       Assessment/Plan     # Hyponatremia moderate   Euvolemic   U osm 130   U Na 28  S/p LR  A/p:  Hyponatremia 2/2 polydipsia   Rapidly correcting   Plan:  DDAVP x 2 doses   D5w 75 cc/ hr x 5 hrs  Na q 4   U lytes   Goal Na tomorrow morning: <= 131     # HTN  Elevated   Amlodipine 5 mg   Monitor     # Migraine  May benefit form headache specialist  Discussed the need to avoid water intake > 60 ounces daily       History of Present Ilness:    Kai Mast is a 68 y.o. female with pmh of migraine headaches who presented with headache/malaise. Yesterday pt  developed a migraine headache and took her prescribed triptan medication She reports drinking a lot of coffee and water overnight to try to help headache. In addition patient then began feeling 'off' in general, a sensation she felt before ~1 year ago when she had an episode of hyponatremia.   Decided to come to ED for evaluation in am. Denies any recent illnesses and denies any other recent symptoms. Pt denies CP/SOB/palpitations/abdominal pain/N/V.   Pt denies fever/ chills  Pt denies dysuria or hematuria     In the ED vitals were stable